# Patient Record
Sex: MALE | Race: WHITE | HISPANIC OR LATINO
[De-identification: names, ages, dates, MRNs, and addresses within clinical notes are randomized per-mention and may not be internally consistent; named-entity substitution may affect disease eponyms.]

---

## 2022-05-02 ENCOUNTER — APPOINTMENT (OUTPATIENT)
Dept: ORTHOPEDIC SURGERY | Facility: CLINIC | Age: 67
End: 2022-05-02
Payer: MEDICARE

## 2022-05-02 VITALS — BODY MASS INDEX: 27.28 KG/M2 | HEIGHT: 68 IN | WEIGHT: 180 LBS

## 2022-05-02 DIAGNOSIS — Z86.39 PERSONAL HISTORY OF OTHER ENDOCRINE, NUTRITIONAL AND METABOLIC DISEASE: ICD-10-CM

## 2022-05-02 DIAGNOSIS — E78.00 PURE HYPERCHOLESTEROLEMIA, UNSPECIFIED: ICD-10-CM

## 2022-05-02 DIAGNOSIS — I10 ESSENTIAL (PRIMARY) HYPERTENSION: ICD-10-CM

## 2022-05-02 PROCEDURE — 76942 ECHO GUIDE FOR BIOPSY: CPT

## 2022-05-02 PROCEDURE — 20550 NJX 1 TENDON SHEATH/LIGAMENT: CPT

## 2022-05-02 PROCEDURE — 99203 OFFICE O/P NEW LOW 30 MIN: CPT | Mod: 25

## 2022-05-02 PROCEDURE — 73080 X-RAY EXAM OF ELBOW: CPT | Mod: RT

## 2022-05-02 PROCEDURE — 99213 OFFICE O/P EST LOW 20 MIN: CPT | Mod: 25

## 2022-05-02 NOTE — HISTORY OF PRESENT ILLNESS
[7] : 7 [5] : 5 [Stabbing] : stabbing [Constant] : constant [Nothing helps with pain getting better] : Nothing helps with pain getting better [Retired] : Work status: retired [de-identified] : right elbow pain [] : no [FreeTextEntry1] : 5/2 pain lat right elbow, may have begun with shoveling [de-identified] : usage

## 2022-05-02 NOTE — PHYSICAL EXAM
[] : tenderness over lateral epicondyle [Right] : right elbow [There are no fractures, subluxations or dislocations. No significant abnormalities are seen] : There are no fractures, subluxations or dislocations. No significant abnormalities are seen

## 2022-06-07 ENCOUNTER — APPOINTMENT (OUTPATIENT)
Dept: ORTHOPEDIC SURGERY | Facility: CLINIC | Age: 67
End: 2022-06-07
Payer: MEDICARE

## 2022-06-07 VITALS — BODY MASS INDEX: 27.28 KG/M2 | HEIGHT: 68 IN | WEIGHT: 180 LBS

## 2022-06-07 PROCEDURE — 99213 OFFICE O/P EST LOW 20 MIN: CPT | Mod: 25

## 2022-06-07 PROCEDURE — 76942 ECHO GUIDE FOR BIOPSY: CPT

## 2022-06-07 PROCEDURE — 20551 NJX 1 TENDON ORIGIN/INSJ: CPT

## 2022-06-07 NOTE — HISTORY OF PRESENT ILLNESS
[7] : 7 [de-identified] : right elbow pain [5] : 5 [Stabbing] : stabbing [Constant] : constant [Nothing helps with pain getting better] : Nothing helps with pain getting better [Retired] : Work status: retired [] : Post Surgical Visit: no [FreeTextEntry1] : 5/2 pain lat right elbow, may have begun with shoveling [FreeTextEntry5] : 6/7 pain returned [FreeTextEntry6] : right elbow [de-identified] : usage  [de-identified] : none

## 2022-06-28 ENCOUNTER — APPOINTMENT (OUTPATIENT)
Dept: ORTHOPEDIC SURGERY | Facility: CLINIC | Age: 67
End: 2022-06-28

## 2022-10-21 ENCOUNTER — APPOINTMENT (OUTPATIENT)
Dept: ORTHOPEDIC SURGERY | Facility: CLINIC | Age: 67
End: 2022-10-21

## 2022-10-21 DIAGNOSIS — M25.521 PAIN IN RIGHT ELBOW: ICD-10-CM

## 2022-10-21 DIAGNOSIS — Z00.00 ENCOUNTER FOR GENERAL ADULT MEDICAL EXAMINATION W/OUT ABNORMAL FINDINGS: ICD-10-CM

## 2022-10-21 PROCEDURE — 99213 OFFICE O/P EST LOW 20 MIN: CPT

## 2022-10-21 PROCEDURE — 73080 X-RAY EXAM OF ELBOW: CPT | Mod: RT

## 2022-10-21 PROCEDURE — 99203 OFFICE O/P NEW LOW 30 MIN: CPT

## 2022-10-21 PROCEDURE — 73030 X-RAY EXAM OF SHOULDER: CPT | Mod: RT

## 2022-10-21 PROCEDURE — 72040 X-RAY EXAM NECK SPINE 2-3 VW: CPT

## 2022-10-21 NOTE — HISTORY OF PRESENT ILLNESS
[7] : 7 [5] : 5 [Stabbing] : stabbing [Constant] : constant [Nothing helps with pain getting better] : Nothing helps with pain getting better [Retired] : Work status: retired [de-identified] : Pt is a 67 year old RHD M who presents today for evaluation of their right shoulder. Pt states that he developed upper arm/shoulder pain  when he woke up. Pain localized along the lateral shoulder with lack or ROM and radiation to his elbow. Denies trauma/previous injury. No numbness/tingling. No formal treatment to date. Difficulty lifting. Last seen 6/7/22 for R elbow lat epicondylitis treated with CSI w/ resolution of pain.  [] : Post Surgical Visit: no [FreeTextEntry5] : 10/21 pain from neck to r shoulder and arm [FreeTextEntry6] : right elbow [FreeTextEntry7] : elbow [de-identified] : usage  [de-identified] : none

## 2022-10-21 NOTE — PHYSICAL EXAM
[Facet arthropathy] : Facet arthropathy [Disc space narrowing] : Disc space narrowing [Right] : right shoulder [Degenerative change] : Degenerative change [] : tenderness over upper arm [No acute displaced fracture or dislocation] : No acute displaced fracture or dislocation

## 2022-10-23 ENCOUNTER — FORM ENCOUNTER (OUTPATIENT)
Age: 67
End: 2022-10-23

## 2022-10-24 ENCOUNTER — APPOINTMENT (OUTPATIENT)
Dept: MRI IMAGING | Facility: CLINIC | Age: 67
End: 2022-10-24

## 2022-10-24 PROCEDURE — 72141 MRI NECK SPINE W/O DYE: CPT | Mod: MH

## 2022-10-24 PROCEDURE — 73221 MRI JOINT UPR EXTREM W/O DYE: CPT | Mod: RT,MH

## 2022-10-31 ENCOUNTER — APPOINTMENT (OUTPATIENT)
Dept: ORTHOPEDIC SURGERY | Facility: CLINIC | Age: 67
End: 2022-10-31
Payer: MEDICARE

## 2022-10-31 VITALS — WEIGHT: 180 LBS | HEIGHT: 68 IN | BODY MASS INDEX: 27.28 KG/M2

## 2022-10-31 DIAGNOSIS — M54.12 RADICULOPATHY, CERVICAL REGION: ICD-10-CM

## 2022-10-31 DIAGNOSIS — M77.11 LATERAL EPICONDYLITIS, RIGHT ELBOW: ICD-10-CM

## 2022-10-31 PROCEDURE — 20551 NJX 1 TENDON ORIGIN/INSJ: CPT

## 2022-10-31 PROCEDURE — 76942 ECHO GUIDE FOR BIOPSY: CPT | Mod: 26

## 2022-10-31 PROCEDURE — 99213 OFFICE O/P EST LOW 20 MIN: CPT | Mod: 25

## 2022-10-31 NOTE — HISTORY OF PRESENT ILLNESS
[Neck] : neck [7] : 7 [5] : 5 [Stabbing] : stabbing [Constant] : constant [Nothing helps with pain getting better] : Nothing helps with pain getting better [Retired] : Work status: retired [de-identified] : Pt is a 67 year old RHD M who presents today for evaluation of their right shoulder. Pt states that he developed upper arm/shoulder pain  when he woke up. Pain localized along the lateral shoulder with lack or ROM and radiation to his elbow. Denies trauma/previous injury. No numbness/tingling. No formal treatment to date. Difficulty lifting. Last seen 6/7/22 for R elbow lat epicondylitis treated with CSI w/ resolution of pain.  [] : Post Surgical Visit: no [FreeTextEntry1] : right shoulder [FreeTextEntry5] : 10/21 pain from neck to r shoulder and arm\par 10/31 mri: calcific tendonitis, labral tear, rotator cuff tendinopathy. mri: mult cerv herniations, arthropathy [FreeTextEntry6] : right elbow [FreeTextEntry7] : elbow [de-identified] : usage  [de-identified] : none

## 2022-11-29 ENCOUNTER — APPOINTMENT (OUTPATIENT)
Dept: ORTHOPEDIC SURGERY | Facility: CLINIC | Age: 67
End: 2022-11-29

## 2023-08-02 ENCOUNTER — TRANSCRIPTION ENCOUNTER (OUTPATIENT)
Age: 68
End: 2023-08-02

## 2023-08-03 ENCOUNTER — APPOINTMENT (OUTPATIENT)
Dept: SURGERY | Facility: CLINIC | Age: 68
End: 2023-08-03
Payer: MEDICARE

## 2023-08-03 VITALS
WEIGHT: 185 LBS | DIASTOLIC BLOOD PRESSURE: 79 MMHG | HEIGHT: 68 IN | SYSTOLIC BLOOD PRESSURE: 154 MMHG | TEMPERATURE: 98 F | BODY MASS INDEX: 28.04 KG/M2 | HEART RATE: 60 BPM | OXYGEN SATURATION: 96 %

## 2023-08-03 DIAGNOSIS — K82.4 CHOLESTEROLOSIS OF GALLBLADDER: ICD-10-CM

## 2023-08-03 PROCEDURE — 99203 OFFICE O/P NEW LOW 30 MIN: CPT

## 2023-08-03 RX ORDER — LEVOTHYROXINE SODIUM 137 UG/1
TABLET ORAL
Refills: 0 | Status: ACTIVE | COMMUNITY

## 2023-08-03 RX ORDER — LISINOPRIL 30 MG/1
TABLET ORAL
Refills: 0 | Status: ACTIVE | COMMUNITY

## 2023-08-10 NOTE — PLAN
[FreeTextEntry1] : Due to the size of the polyp which is less than 5mm, recommend repeat ultrasound in 6 months (11/02/2023) to monitor size of polyps. At any point, if pt has any symptoms, pt was advised to return to the office to discuss next steps.  Vance review results of the next US once it is done with the patient. No recommendation for surgery at this time. All questions answered in detail and the patient expressed full understanding.

## 2023-08-10 NOTE — HISTORY OF PRESENT ILLNESS
[de-identified] : EM is a 68 year old patient with history of hypertension and hypothyroidism presenting for evaluation of gallbladder polyps after routine abdominal ultrasound prescribed by Dr. Abarca. Patient went for annual physical examination when Dr. Abarca recommended abdominal ultrasound. Patient is asymptomatic. Abdominal ultrasound on May 16th, 2023 showed one 4.4mm polyp and on 2.8mm polyp in the gallbladder. No other gallbladder abnormalities noted. Patient denies any RUQ pain, yellowing of skin or eyes, vomiting, bloody stools, or diarrhea.

## 2023-08-10 NOTE — PHYSICAL EXAM
[Normal Breath Sounds] : Normal breath sounds [Normal Heart Sounds] : normal heart sounds [Normal Rate and Rhythm] : normal rate and rhythm [No HSM] : no hepatosplenomegaly [No Rash or Lesion] : No rash or lesion [Alert] : alert [Oriented to Person] : oriented to person [Oriented to Place] : oriented to place [Oriented to Time] : oriented to time [Calm] : calm [JVD] : no jugular venous distention  [Abdominal Masses] : No abdominal masses [Abdomen Tenderness] : ~T ~M No abdominal tenderness [de-identified] : well-appearing, no apparent distress [de-identified] : soft, non-distended, nontender to palpation

## 2023-11-16 ENCOUNTER — APPOINTMENT (OUTPATIENT)
Dept: ORTHOPEDIC SURGERY | Facility: CLINIC | Age: 68
End: 2023-11-16
Payer: MEDICARE

## 2023-11-16 VITALS — WEIGHT: 185 LBS | BODY MASS INDEX: 28.04 KG/M2 | HEIGHT: 68 IN

## 2023-11-16 PROCEDURE — J3490M: CUSTOM | Mod: NC

## 2023-11-16 PROCEDURE — 73030 X-RAY EXAM OF SHOULDER: CPT | Mod: 50

## 2023-11-16 PROCEDURE — 20611 DRAIN/INJ JOINT/BURSA W/US: CPT | Mod: RT

## 2023-11-16 PROCEDURE — 99213 OFFICE O/P EST LOW 20 MIN: CPT | Mod: 25

## 2023-11-21 ENCOUNTER — APPOINTMENT (OUTPATIENT)
Dept: MRI IMAGING | Facility: CLINIC | Age: 68
End: 2023-11-21
Payer: MEDICARE

## 2023-11-21 PROCEDURE — 73221 MRI JOINT UPR EXTREM W/O DYE: CPT | Mod: LT,MH

## 2023-11-28 ENCOUNTER — APPOINTMENT (OUTPATIENT)
Dept: ORTHOPEDIC SURGERY | Facility: CLINIC | Age: 68
End: 2023-11-28
Payer: MEDICARE

## 2023-11-28 VITALS — HEIGHT: 68 IN | WEIGHT: 185 LBS | BODY MASS INDEX: 28.04 KG/M2

## 2023-11-28 DIAGNOSIS — S43.432D SUPERIOR GLENOID LABRUM LESION OF LEFT SHOULDER, SUBSEQUENT ENCOUNTER: ICD-10-CM

## 2023-11-28 DIAGNOSIS — S46.012A STRAIN OF MUSCLE(S) AND TENDON(S) OF THE ROTATOR CUFF OF LEFT SHOULDER, INITIAL ENCOUNTER: ICD-10-CM

## 2023-11-28 DIAGNOSIS — M75.42 IMPINGEMENT SYNDROME OF LEFT SHOULDER: ICD-10-CM

## 2023-11-28 PROCEDURE — 99214 OFFICE O/P EST MOD 30 MIN: CPT

## 2024-02-12 ENCOUNTER — APPOINTMENT (OUTPATIENT)
Dept: ORTHOPEDIC SURGERY | Facility: CLINIC | Age: 69
End: 2024-02-12
Payer: MEDICARE

## 2024-02-12 PROCEDURE — J3490M: CUSTOM | Mod: NC

## 2024-02-12 PROCEDURE — 20610 DRAIN/INJ JOINT/BURSA W/O US: CPT | Mod: RT

## 2024-02-12 PROCEDURE — 73030 X-RAY EXAM OF SHOULDER: CPT | Mod: RT

## 2024-02-12 PROCEDURE — 99203 OFFICE O/P NEW LOW 30 MIN: CPT | Mod: 25

## 2024-02-12 NOTE — ASSESSMENT
[FreeTextEntry1] : R shoulder pain over last 3 weeks with XR and exam consistent with calcific tendinitis, impingement - R shoulder subacromial CSI given, tolerated well - Tylenol and ice as needed for injection site relief - Recommend starting PT from PCP - Follow up in 4-6w to check on symptoms.

## 2024-02-12 NOTE — IMAGING
[de-identified] : Neck: - No obvious deformity, swelling, or bruising - No pain with palpation of spinous processes or paraspinal musculature - ROM intact throughout flexion, extension, side bending, and rotation - 5/5 strength throughout UE evaluation bilaterally  - Distally neurovascularly intact  R shoulder: - No obvious deformity or swelling - No pain with palpation over AC joint, anterior or posterior shoulder - Forward flexion to 180 degrees, External rotation to 20 degrees, Internal rotation to L spine. Pain with max flexion - 5/5 strength with internal and external rotation - Pain without weakness on Empty can - Painful impingement testing - Negative Speeds - Distally neurovascularly intact        [Right] : right shoulder [There are no fractures, subluxations or dislocations. No significant abnormalities are seen] : There are no fractures, subluxations or dislocations. No significant abnormalities are seen [Degenerative change] : Degenerative change [AC Joint Arthrosis] : AC Joint Arthrosis [Calcific density] : Calcific density [FreeTextEntry1] : calcific tendinopathy, appears slightly more advanced from previous R shoulder XR

## 2024-02-12 NOTE — PHYSICAL EXAM
[de-identified] : Constitutional: Well developed, well nourished, able to communicate Neuro: Normal sensation, No focal deficits Skin: Intact CV: Peripheral vascular exam grossly normal Pulm: No signs of respiratory distress Psych: Oriented, normal mood and affect

## 2024-02-12 NOTE — HISTORY OF PRESENT ILLNESS
[9] : 9 [2] : 2 [Dull/Aching] : dull/aching [Throbbing] : throbbing [Constant] : constant [Nothing helps with pain getting better] : Nothing helps with pain getting better [de-identified] : 02/12/2024: Pt is a 70yo male presenting for evaluation of R shoulder pain that has been present over the last 3 weeks without injury. He is currently in PT for pain related to his L shoulder. States R shoulder pain has worsened since starting, travels down the lateral aspect of his arm to his elbow. Pain is worse with reaching overhead. He tried biofreeze without improvement in symptoms. He denies loss of strength or ROM. His PCP gave him a referral for PT which he hasn't started yet.  [] : no [FreeTextEntry1] : right shoulder pain

## 2024-02-12 NOTE — PROCEDURE
[FreeTextEntry3] : A pre-procedure verification was performed by asking the patient to state their name, , and the location of the procedure being performed in their own words.  A review of allergies was accomplished through dialog and the patient, ending thus in the full agreement. The risks and benefits were explained and consent were obtained verbally.  Procedure: Bursal Injection >>  Subacromial, Right Consent was obtained. Patients questions were answered to the best of my ability prior to procedure. Injection site and surrounding bony landmarks were palpated and marked prior to proceeding. Site was cleaned and prepped in the typical fashion using alcohol swabs. Cold spray used on skin for patient comfort. Needle was advanced into the bursal space from the posterior approach. A combination of 3cc Marcaine w/o Epi and 80mg Depomedrol was then injected into the joint. Patient tolerated the procedure well, without significant complications. Minimal (<3cc) blood loss experienced.

## 2024-03-11 ENCOUNTER — APPOINTMENT (OUTPATIENT)
Dept: ORTHOPEDIC SURGERY | Facility: CLINIC | Age: 69
End: 2024-03-11
Payer: MEDICARE

## 2024-03-11 PROCEDURE — 20610 DRAIN/INJ JOINT/BURSA W/O US: CPT | Mod: RT

## 2024-03-11 PROCEDURE — 99214 OFFICE O/P EST MOD 30 MIN: CPT | Mod: 25

## 2024-03-11 PROCEDURE — J3490M: CUSTOM | Mod: NC

## 2024-03-11 RX ORDER — MELOXICAM 15 MG/1
15 TABLET ORAL
Qty: 30 | Refills: 0 | Status: ACTIVE | COMMUNITY
Start: 2023-11-28 | End: 1900-01-01

## 2024-03-11 NOTE — HISTORY OF PRESENT ILLNESS
[9] : 9 [2] : 2 [Dull/Aching] : dull/aching [Throbbing] : throbbing [Constant] : constant [Nothing helps with pain getting better] : Nothing helps with pain getting better [de-identified] : 03/11/2024: Patient is here today to follow up on right shoulder pain. He reports no change since the last visit, the CSI he received did not give him any relief. Pt has been going to PT 2x a week and states that he still feels pain. Also ongoing symptoms of L shoulder weakness for which he has been doing PT. Unable to actively bring L arm above the head. No pain in trying to do so. Was seeing Dr. Boyce for the L.   02/12/2024: Pt is a 70yo male presenting for evaluation of R shoulder pain that has been present over the last 3 weeks without injury. He is currently in PT for pain related to his L shoulder. States R shoulder pain has worsened since starting, travels down the lateral aspect of his arm to his elbow. Pain is worse with reaching overhead. He tried biofreeze without improvement in symptoms. He denies loss of strength or ROM. His PCP gave him a referral for PT which he hasn't started yet.  [] : no [FreeTextEntry1] : right shoulder pain

## 2024-03-11 NOTE — PHYSICAL EXAM
[de-identified] : Constitutional: Well developed, well nourished, able to communicate Neuro: Normal sensation, No focal deficits Skin: Intact CV: Peripheral vascular exam grossly normal Pulm: No signs of respiratory distress Psych: Oriented, normal mood and affect

## 2024-03-11 NOTE — IMAGING
[Right] : right shoulder [There are no fractures, subluxations or dislocations. No significant abnormalities are seen] : There are no fractures, subluxations or dislocations. No significant abnormalities are seen [Degenerative change] : Degenerative change [AC Joint Arthrosis] : AC Joint Arthrosis [Calcific density] : Calcific density [de-identified] : Neck: - No obvious deformity, swelling, or bruising - No pain with palpation of spinous processes or paraspinal musculature - ROM intact throughout flexion, extension, side bending, and rotation - 5/5 strength throughout UE evaluation bilaterally  - Distally neurovascularly intact  R shoulder: - No obvious deformity or swelling - No pain with palpation over AC joint, anterior or posterior shoulder - Forward flexion to 180 degrees, External rotation to 20 degrees, Internal rotation to L spine. Pain with max flexion - 5/5 strength with internal and external rotation - Pain without weakness on Empty can - Painful impingement testing - Pain with Speeds - Distally neurovascularly intact  L shoulder: - No obvious deformity or swelling - No pain with palpation over AC joint, anterior or posterior shoulder - Forward flexion actively to 80 degrees/passively to 180, External rotation to 10 degrees/passively to 30, Internal rotation to T12 - 5/5 strength with internal rotation. 3/5 strength with external rotation - Positive Empty can - Negative impingement testing - Distally neurovascularly intact   [FreeTextEntry1] : calcific tendinopathy, appears slightly more advanced from previous R shoulder XR

## 2024-03-11 NOTE — ASSESSMENT
[FreeTextEntry1] : Follow-up of right shoulder calcific tendinitis and impingement without improvement from corticosteroid injection and physical therapy given at last visit. - MRI right shoulder for further evaluation of rotator cuff tendon tendinopathy or tear - Meloxicam prescription given to take daily as needed  Was also seeing a different provider for his left shoulder where he had an MRI showing partial-thickness rotator cuff tears, degenerative changes of the AC joint, degenerative labral injuries, small glenohumeral joint effusion.  His pain has resolved with physical therapy however he has significant weakness without range of motion limitations in forward flexion and external rotation of the shoulder.  Possible suprascapular nerve impingement however MRI not showing any pathology to either the spinal scapular or spinal glenoid notch - Nerve conduction studies ordered to evaluate for suprascapular versus radicular cause of left upper extremity weakness - Advised to continue range of motion and physical activity whether that be with home exercises or physical therapy - Follow-up after nerve conduction studies

## 2024-03-20 ENCOUNTER — APPOINTMENT (OUTPATIENT)
Dept: MRI IMAGING | Facility: CLINIC | Age: 69
End: 2024-03-20
Payer: MEDICARE

## 2024-03-20 PROCEDURE — 73221 MRI JOINT UPR EXTREM W/O DYE: CPT | Mod: RT,MH

## 2024-03-21 ENCOUNTER — APPOINTMENT (OUTPATIENT)
Dept: NEUROLOGY | Facility: CLINIC | Age: 69
End: 2024-03-21
Payer: MEDICARE

## 2024-03-21 PROCEDURE — 95911 NRV CNDJ TEST 9-10 STUDIES: CPT

## 2024-03-21 PROCEDURE — 95886 MUSC TEST DONE W/N TEST COMP: CPT

## 2024-03-29 ENCOUNTER — APPOINTMENT (OUTPATIENT)
Dept: ORTHOPEDIC SURGERY | Facility: CLINIC | Age: 69
End: 2024-03-29
Payer: MEDICARE

## 2024-03-29 DIAGNOSIS — M75.31 CALCIFIC TENDINITIS OF RIGHT SHOULDER: ICD-10-CM

## 2024-03-29 DIAGNOSIS — G56.90 UNSPECIFIED MONONEUROPATHY OF UNSPECIFIED UPPER LIMB: ICD-10-CM

## 2024-03-29 DIAGNOSIS — M75.21 BICIPITAL TENDINITIS, RIGHT SHOULDER: ICD-10-CM

## 2024-03-29 DIAGNOSIS — M75.32 CALCIFIC TENDINITIS OF LEFT SHOULDER: ICD-10-CM

## 2024-03-29 DIAGNOSIS — G56.82 OTHER SPECIFIED MONONEUROPATHIES OF LEFT UPPER LIMB: ICD-10-CM

## 2024-03-29 DIAGNOSIS — M75.41 IMPINGEMENT SYNDROME OF RIGHT SHOULDER: ICD-10-CM

## 2024-03-29 DIAGNOSIS — R29.898 OTHER SYMPTOMS AND SIGNS INVOLVING THE MUSCULOSKELETAL SYSTEM: ICD-10-CM

## 2024-03-29 PROCEDURE — 20611 DRAIN/INJ JOINT/BURSA W/US: CPT | Mod: RT

## 2024-03-29 PROCEDURE — J3490M: CUSTOM | Mod: NC

## 2024-03-29 PROCEDURE — 99214 OFFICE O/P EST MOD 30 MIN: CPT | Mod: 25

## 2024-03-29 NOTE — PHYSICAL EXAM
[de-identified] : Constitutional: Well developed, well nourished, able to communicate Neuro: Normal sensation, No focal deficits Skin: Intact CV: Peripheral vascular exam grossly normal Pulm: No signs of respiratory distress Psych: Oriented, normal mood and affect

## 2024-03-29 NOTE — ASSESSMENT
[FreeTextEntry1] : Follow-up of right shoulder calcific tendinitis and impingement without improvement from corticosteroid injection and physical therapy given at last visit.MRI showing findings of glenohumeral osteoarthritic changes with effusion, biceps tenosynovitis, rotator cuff tendinopathy, calcific tendinitis of the supraspinatus. - Right ultrasound-guided glenohumeral corticosteroid injection given to help with both arthritis/joint effusion and biceps tendinitis - Discussed he can use Tylenol and ice as needed for injection site relief - In 2 to 4 days he can begin taking meloxicam if needed - Physical therapy prescription given again  Was also seeing a different provider for his left shoulder where he had an MRI showing partial-thickness rotator cuff tears, degenerative changes of the AC joint, degenerative labral injuries, small glenohumeral joint effusion.  His pain has resolved with physical therapy however he has significant weakness without range of motion limitations in forward flexion and external rotation of the shoulder.  Possible suprascapular nerve impingement however MRI not showing any pathology to either the spinal scapular or spinal glenoid notch. Left upper extremity EMG showing chronic denervation/reinnervation changes of the suprascapular nerve and axillary nerve which fits into the findings of his left shoulder weakness. - Advised to continue range of motion and physical activity whether that be with home exercises or physical therapy   - Follow-up in 6-8 weeks

## 2024-03-29 NOTE — IMAGING
[Right] : right shoulder [There are no fractures, subluxations or dislocations. No significant abnormalities are seen] : There are no fractures, subluxations or dislocations. No significant abnormalities are seen [Degenerative change] : Degenerative change [AC Joint Arthrosis] : AC Joint Arthrosis [Calcific density] : Calcific density [de-identified] : Neck: - No obvious deformity, swelling, or bruising - No pain with palpation of spinous processes or paraspinal musculature - ROM intact throughout flexion, extension, side bending, and rotation - 5/5 strength throughout UE evaluation bilaterally  - Distally neurovascularly intact  R shoulder: - No obvious deformity or swelling - No pain with palpation over AC joint, anterior or posterior shoulder - Forward flexion to 180 degrees, External rotation to 20 degrees, Internal rotation to L spine. Pain with max flexion - 5/5 strength with internal and external rotation - Pain without weakness on Empty can - Painful impingement testing - Pain with Speeds - Distally neurovascularly intact  L shoulder: - No obvious deformity or swelling - No pain with palpation over AC joint, anterior or posterior shoulder - Forward flexion actively to 80 degrees/passively to 180, External rotation to 10 degrees/passively to 30, Internal rotation to T12 - 5/5 strength with internal rotation. 3/5 strength with external rotation - Positive Empty can - Negative impingement testing - Distally neurovascularly intact   [FreeTextEntry1] : calcific tendinopathy, appears slightly more advanced from previous R shoulder XR

## 2024-03-29 NOTE — HISTORY OF PRESENT ILLNESS
[9] : 9 [2] : 2 [Dull/Aching] : dull/aching [Throbbing] : throbbing [Constant] : constant [Nothing helps with pain getting better] : Nothing helps with pain getting better [de-identified] : 03/29/2024: Presenting for follow-up of bilateral shoulders after obtaining nerve conduction studies of left upper extremity and right shoulder MRI.  States he has paused physical therapy since last visit while obtaining new testings.  He denies any significant change in his bilateral shoulder symptoms.  The left shoulder still feels weak without much pain.  The right shoulder still feels pain with movement  03/11/2024: Patient is here today to follow up on right shoulder pain. He reports no change since the last visit, the CSI he received did not give him any relief. Pt has been going to PT 2x a week and states that he still feels pain. Also ongoing symptoms of L shoulder weakness for which he has been doing PT. Unable to actively bring L arm above the head. No pain in trying to do so. Was seeing Dr. Boyce for the L.   02/12/2024: Pt is a 68yo male presenting for evaluation of R shoulder pain that has been present over the last 3 weeks without injury. He is currently in PT for pain related to his L shoulder. States R shoulder pain has worsened since starting, travels down the lateral aspect of his arm to his elbow. Pain is worse with reaching overhead. He tried biofreeze without improvement in symptoms. He denies loss of strength or ROM. His PCP gave him a referral for PT which he hasn't started yet.  [FreeTextEntry1] : right shoulder pain [] : no

## 2024-03-29 NOTE — PROCEDURE
[FreeTextEntry3] : A pre-procedure verification was performed by asking the patient to state their name, , and the location of the procedure being performed in their own words.  A review of allergies was accomplished through dialog and the patient, ending thus in the full agreement. The risks and benefits were explained and consent were obtained verbally.  Procedure: Ultrasound Guided Intraarticular Injection >>  Glenohumeral  Right Consent was obtained. Patients questions were answered to the best of my ability prior to procedure. Injection site and surrounding bony landmarks were palpated, and marked prior to proceeding. Target location and needle pathway identified under ultrasound. Ultrasound probe was sanitized in the typical fashion prior to application of sterile gel. Injection site was cleaned and prepped in the typical fashion using alcohol swabs. Cold spray used on skin for patient comfort. Site was identified once again with ultrasound. Needle was advanced into the intraarticular space from the posterior approach.3cc of Marcaine without Epi and 80mg of Depomedrol was then injected into the joint. Patient tolerated the procedure well, without significant complications. Minimal (<3cc) blood loss experienced. Images were saved into patient's chart.

## 2024-04-09 ENCOUNTER — APPOINTMENT (OUTPATIENT)
Dept: ORTHOPEDIC SURGERY | Facility: CLINIC | Age: 69
End: 2024-04-09
Payer: MEDICARE

## 2024-04-09 PROCEDURE — 73503 X-RAY EXAM HIP UNI 4/> VIEWS: CPT | Mod: RT

## 2024-04-09 PROCEDURE — 73564 X-RAY EXAM KNEE 4 OR MORE: CPT | Mod: 50

## 2024-04-09 PROCEDURE — J3490M: CUSTOM | Mod: NC

## 2024-04-09 PROCEDURE — 99214 OFFICE O/P EST MOD 30 MIN: CPT | Mod: 25

## 2024-04-09 PROCEDURE — 20611 DRAIN/INJ JOINT/BURSA W/US: CPT | Mod: RT

## 2024-04-09 RX ORDER — MELOXICAM 15 MG/1
15 TABLET ORAL
Qty: 30 | Refills: 1 | Status: ACTIVE | COMMUNITY
Start: 2024-04-09 | End: 1900-01-01

## 2024-04-09 NOTE — PHYSICAL EXAM
-- New Patient Visit Needed With [facility] PCP--    Called and left voicemail for patient to return call.  Patient was seen at an [facility] and has no PCP and would like a New Patient appointment to establish care with an [facility] PCP.      ACC AGENT: Please assist patient by scheduling a New Patient Visit [5] to establish care with an [facility] PCP from one of the [facility] sites listed below.     Potential [facility] Providers and [facility] Locations near patient's Zipcode:   [facility] Site: [facility] PCP: Dottie Quinn MD (FP) Delonte Lewis MD (FP)      Scheduling Instructions  - Navigate to Patient's Appt Desk  - Click on the Orders Tab  - Locate “Service to Family Practice” order  - Schedule from order and choose “New Patient” as the visit type when prompted.    Insurance Validated?: No.  Ensure that patients insurance is in network (Verify with the PIE tool on Knowledge Base).  Patients with HMO insurance plans will need to be assigned to the [facility] Physician or practice site.  Instruct Patient to call the Customer Service number on the back of their insurance card and request to be assigned to the [facility] PCP that was chosen.  
[de-identified] : Constitutional: Well developed, well nourished, able to communicate Neuro: Normal sensation, No focal deficits Skin: Intact CV: Peripheral vascular exam grossly normal Pulm: No signs of respiratory distress Psych: Oriented, normal mood and affect

## 2024-04-09 NOTE — HISTORY OF PRESENT ILLNESS
[de-identified] : 04/09/2024: Patient is a 69-year-old male presenting for evaluation of right hip/anterior thigh and bilateral knee pain.  States pain in the right anterior thigh has been present for about 1 week and is causing him to limp with walking.  Pain is worse with walking and standing.  Radiation of pain into the thigh.  Denies any injuries.  He has a prescription for meloxicam which he has not yet tried.  Also complains of bilateral knee pain that he feels at night when lying down before bed.  Describes this as a throbbing sensation.  Has been thought to have restless leg syndrome in the past and is on gabapentin for this.  He does not think this is helping.  He denies any radiation of pain from his knees, weakness, paresthesias  [] : no

## 2024-04-09 NOTE — IMAGING
[de-identified] : L hip: - No obvious deformity or swelling - No tenderness to palpation of greater trochanter - No pain with full flexion, internal/external rotation - 5/5 strength hip flexion, abduction, adduction - Negative MATT, FADIR - Negative Log roll - Distally neurovascularly intact    R hip: - No obvious deformity or swelling - No tenderness to palpation of greater trochanter -Flexion up to about 100 degrees.  Limited internal rotation with pain full external rotation with no pain  - 5/5 strength hip flexion, abduction, adduction - Negative MATT - Positive FADIR - Negative Log roll - Distally neurovascularly intact   L knee: - No obvious deformity or swelling - No pain with palpation of medial or lateral joint line. - ROM from 135 degrees of flexion to 0 degrees extension. - 5/5 strength with knee flexion and extension - Stable varus, valgus, Lachman's, posterior drawer testing - Negative Ruchi's - Negative patellar grind - Distally neurovascularly intact.     R knee: - No obvious deformity or swelling - No pain with palpation of medial or lateral joint line. - ROM from 135 degrees of flexion to 0 degrees extension. - 5/5 strength with knee flexion and extension - Stable varus, valgus, Lachman's, posterior drawer testing - Negative Ruchi's - Negative patellar grind - Distally neurovascularly intact.  [Right] : right hip with pelvis [Moderate arthritis (Tonnis Grade 2)] : Moderate arthritis (Tonnis Grade 2) [Bilateral] : knee bilaterally [There are no fractures, subluxations or dislocations. No significant abnormalities are seen] : There are no fractures, subluxations or dislocations. No significant abnormalities are seen [Degenerative change] : Degenerative change [Mild tricompartmental OA medial narrowing] : Mild tricompartmental OA medial narrowing [Mild tricompartmental OA lateral narrowing] : Mild tricompartmental OA lateral narrowing [Mild patellofemoral OA] : Mild patellofemoral OA [FreeTextEntry9] : Calcific tendinopathy laterally on the right greater trochanter

## 2024-04-09 NOTE — ASSESSMENT
[FreeTextEntry1] : 1 week of right thigh/groin pain with exam and x-rays consistent with hip osteoarthritis.  Bilateral knee pain as well that is mainly present at night after a long day.  X-rays showing osteoarthritis.  States history of restless leg syndrome and is prescribed gabapentin for his knees, he does not think this helps. - Right hip ultrasound-guided corticosteroid injection given.  Tolerated well - Recommend use of Tylenol and ice as needed for injection site relief - Advised to wait 2 days but then can start taking Mobic once daily to help with his knee pain if needed - He has a follow-up scheduled for May in order to discuss his shoulders which were previously treated.  Can rediscuss knees at this time

## 2024-04-30 ENCOUNTER — APPOINTMENT (OUTPATIENT)
Dept: ORTHOPEDIC SURGERY | Facility: CLINIC | Age: 69
End: 2024-04-30
Payer: MEDICARE

## 2024-04-30 DIAGNOSIS — M17.0 BILATERAL PRIMARY OSTEOARTHRITIS OF KNEE: ICD-10-CM

## 2024-04-30 DIAGNOSIS — M16.11 UNILATERAL PRIMARY OSTEOARTHRITIS, RIGHT HIP: ICD-10-CM

## 2024-04-30 PROCEDURE — 20610 DRAIN/INJ JOINT/BURSA W/O US: CPT | Mod: RT

## 2024-04-30 PROCEDURE — J3490M: CUSTOM | Mod: NC

## 2024-04-30 PROCEDURE — 99213 OFFICE O/P EST LOW 20 MIN: CPT | Mod: 25

## 2024-04-30 RX ORDER — NAPROXEN 500 MG/1
500 TABLET ORAL
Qty: 60 | Refills: 0 | Status: ACTIVE | COMMUNITY
Start: 2024-04-30 | End: 1900-01-01

## 2024-04-30 NOTE — ASSESSMENT
[FreeTextEntry1] : f/u of R thigh/bilateral knee pain. Given R hip CSI on 4/9/24 with some improvement in symptoms. Still complaining of R knee pain. Exam largely normal, but OA on XR - R knee CSI given, tolerated well. - Recommend use of Tylenol and ice as needed for injection site relief - Naproxen BID instead of mobic as he thinks it has not helped - Keep appt for May

## 2024-04-30 NOTE — HISTORY OF PRESENT ILLNESS
[de-identified] : 04/30/2024: JOSE 69 year M is here to follow up on his right thigh and bilateral knee. He was given R hip CSI at last visit with improvement in groin/anterior thigh pain. Still feels some but not as bad. States R knee is still giving him issues with walking and laying down before bed. L knee not as bad. He denies new injury.  04/09/2024: Patient is a 69-year-old male presenting for evaluation of right hip/anterior thigh and bilateral knee pain.  States pain in the right anterior thigh has been present for about 1 week and is causing him to limp with walking.  Pain is worse with walking and standing.  Radiation of pain into the thigh.  Denies any injuries.  He has a prescription for meloxicam which he has not yet tried.  Also complains of bilateral knee pain that he feels at night when lying down before bed.  Describes this as a throbbing sensation.  Has been thought to have restless leg syndrome in the past and is on gabapentin for this.  He does not think this is helping.  He denies any radiation of pain from his knees, weakness, paresthesias  [] : no

## 2024-04-30 NOTE — IMAGING
[Right] : right hip with pelvis [Moderate arthritis (Tonnis Grade 2)] : Moderate arthritis (Tonnis Grade 2) [Bilateral] : knee bilaterally [There are no fractures, subluxations or dislocations. No significant abnormalities are seen] : There are no fractures, subluxations or dislocations. No significant abnormalities are seen [Degenerative change] : Degenerative change [Mild tricompartmental OA medial narrowing] : Mild tricompartmental OA medial narrowing [Mild tricompartmental OA lateral narrowing] : Mild tricompartmental OA lateral narrowing [Mild patellofemoral OA] : Mild patellofemoral OA [de-identified] : L hip: - No obvious deformity or swelling - No tenderness to palpation of greater trochanter - No pain with full flexion, internal/external rotation - 5/5 strength hip flexion, abduction, adduction - Negative MATT, FADIR - Negative Log roll - Distally neurovascularly intact    R hip: - No obvious deformity or swelling - No tenderness to palpation of greater trochanter -Flexion up to about 100 degrees.  Limited internal rotation with pain, full external rotation with no pain  - 5/5 strength hip flexion, abduction, adduction - Negative MATT - Improved, but Positive FADIR - Negative Log roll - Distally neurovascularly intact   L knee: - No obvious deformity or swelling - No pain with palpation of medial or lateral joint line. - ROM from 135 degrees of flexion to 0 degrees extension. - 5/5 strength with knee flexion and extension - Stable varus, valgus, Lachman's, posterior drawer testing - Negative Ruchi's - Negative patellar grind - Distally neurovascularly intact.     R knee: - No obvious deformity or swelling - No pain with palpation of medial or lateral joint line. - ROM from 135 degrees of flexion to 0 degrees extension. - 5/5 strength with knee flexion and extension - Stable varus, valgus, Lachman's, posterior drawer testing - Negative Ruchi's - Negative patellar grind - Distally neurovascularly intact.  [FreeTextEntry9] : Calcific tendinopathy laterally on the right greater trochanter

## 2024-04-30 NOTE — PHYSICAL EXAM
[de-identified] : Constitutional: Well developed, well nourished, able to communicate Neuro: Normal sensation, No focal deficits Skin: Intact CV: Peripheral vascular exam grossly normal Pulm: No signs of respiratory distress Psych: Oriented, normal mood and affect

## 2024-04-30 NOTE — PROCEDURE
[FreeTextEntry3] : A pre-procedure verification was performed by asking the patient to state their name, , and the location of the procedure being performed in their own words.  A review of allergies was accomplished through dialog and the patient, ending thus in the full agreement. The risks and benefits were explained and consent were obtained verbally.  Procedure: Intraarticular Injection >>  Knee, Right Consent was obtained. Patients questions were answered to the best of my ability prior to procedure. Injection site and surrounding bony landmarks were palpated and marked prior to proceeding. Site was cleaned and prepped in the typical fashion using alcohol swabs. Cold spray used on skin for patient comfort. Needle was advanced into the intraarticular space from the inferolateral approach. A combination of 3cc Marcaine w/o Epi and 80mg Depomedrol was then injected into the joint. Patient tolerated the procedure well, without significant complications. Minimal (<3cc) blood loss experienced.

## 2024-05-21 ENCOUNTER — APPOINTMENT (OUTPATIENT)
Dept: ORTHOPEDIC SURGERY | Facility: CLINIC | Age: 69
End: 2024-05-21

## 2024-12-30 ENCOUNTER — APPOINTMENT (OUTPATIENT)
Dept: ORTHOPEDIC SURGERY | Facility: CLINIC | Age: 69
End: 2024-12-30
Payer: MEDICARE

## 2024-12-30 PROCEDURE — 99214 OFFICE O/P EST MOD 30 MIN: CPT | Mod: 25

## 2024-12-30 PROCEDURE — 20610 DRAIN/INJ JOINT/BURSA W/O US: CPT | Mod: RT

## 2025-01-06 ENCOUNTER — APPOINTMENT (OUTPATIENT)
Dept: ORTHOPEDIC SURGERY | Facility: CLINIC | Age: 70
End: 2025-01-06
Payer: MEDICARE

## 2025-01-06 PROCEDURE — 99212 OFFICE O/P EST SF 10 MIN: CPT | Mod: 25

## 2025-01-06 PROCEDURE — 20610 DRAIN/INJ JOINT/BURSA W/O US: CPT | Mod: RT

## 2025-01-13 ENCOUNTER — APPOINTMENT (OUTPATIENT)
Dept: ORTHOPEDIC SURGERY | Facility: CLINIC | Age: 70
End: 2025-01-13
Payer: MEDICARE

## 2025-01-13 DIAGNOSIS — M17.0 BILATERAL PRIMARY OSTEOARTHRITIS OF KNEE: ICD-10-CM

## 2025-01-13 PROCEDURE — 99212 OFFICE O/P EST SF 10 MIN: CPT | Mod: 25

## 2025-01-13 PROCEDURE — 20610 DRAIN/INJ JOINT/BURSA W/O US: CPT | Mod: RT
